# Patient Record
Sex: FEMALE | Race: WHITE | NOT HISPANIC OR LATINO | URBAN - METROPOLITAN AREA
[De-identification: names, ages, dates, MRNs, and addresses within clinical notes are randomized per-mention and may not be internally consistent; named-entity substitution may affect disease eponyms.]

---

## 2021-07-01 ENCOUNTER — EMERGENCY (EMERGENCY)
Facility: HOSPITAL | Age: 46
LOS: 0 days | Discharge: HOME | End: 2021-07-01
Attending: EMERGENCY MEDICINE
Payer: COMMERCIAL

## 2021-07-01 VITALS
OXYGEN SATURATION: 96 % | SYSTOLIC BLOOD PRESSURE: 160 MMHG | TEMPERATURE: 98 F | RESPIRATION RATE: 18 BRPM | HEART RATE: 111 BPM | DIASTOLIC BLOOD PRESSURE: 108 MMHG

## 2021-07-01 VITALS
SYSTOLIC BLOOD PRESSURE: 186 MMHG | HEIGHT: 65 IN | OXYGEN SATURATION: 96 % | WEIGHT: 210.1 LBS | RESPIRATION RATE: 19 BRPM | TEMPERATURE: 98 F | DIASTOLIC BLOOD PRESSURE: 112 MMHG | HEART RATE: 128 BPM

## 2021-07-01 DIAGNOSIS — J45.909 UNSPECIFIED ASTHMA, UNCOMPLICATED: ICD-10-CM

## 2021-07-01 DIAGNOSIS — G43.909 MIGRAINE, UNSPECIFIED, NOT INTRACTABLE, WITHOUT STATUS MIGRAINOSUS: ICD-10-CM

## 2021-07-01 DIAGNOSIS — I10 ESSENTIAL (PRIMARY) HYPERTENSION: ICD-10-CM

## 2021-07-01 DIAGNOSIS — G51.0 BELL'S PALSY: ICD-10-CM

## 2021-07-01 LAB
ALBUMIN SERPL ELPH-MCNC: 3.5 G/DL — SIGNIFICANT CHANGE UP (ref 3.3–5)
ALP SERPL-CCNC: 81 U/L — SIGNIFICANT CHANGE UP (ref 40–120)
ALT FLD-CCNC: 31 U/L — SIGNIFICANT CHANGE UP (ref 12–78)
ANION GAP SERPL CALC-SCNC: 5 MMOL/L — SIGNIFICANT CHANGE UP (ref 5–17)
APTT BLD: 36 SEC — HIGH (ref 27.5–35.5)
AST SERPL-CCNC: 30 U/L — SIGNIFICANT CHANGE UP (ref 15–37)
BASOPHILS # BLD AUTO: 0.07 K/UL — SIGNIFICANT CHANGE UP (ref 0–0.2)
BASOPHILS NFR BLD AUTO: 0.7 % — SIGNIFICANT CHANGE UP (ref 0–2)
BILIRUB SERPL-MCNC: 0.3 MG/DL — SIGNIFICANT CHANGE UP (ref 0.2–1.2)
BUN SERPL-MCNC: 10 MG/DL — SIGNIFICANT CHANGE UP (ref 7–23)
CALCIUM SERPL-MCNC: 8.5 MG/DL — SIGNIFICANT CHANGE UP (ref 8.5–10.1)
CHLORIDE SERPL-SCNC: 107 MMOL/L — SIGNIFICANT CHANGE UP (ref 96–108)
CO2 SERPL-SCNC: 26 MMOL/L — SIGNIFICANT CHANGE UP (ref 22–31)
CREAT SERPL-MCNC: 1.12 MG/DL — SIGNIFICANT CHANGE UP (ref 0.5–1.3)
D DIMER BLD IA.RAPID-MCNC: <150 NG/ML DDU — SIGNIFICANT CHANGE UP
EOSINOPHIL # BLD AUTO: 0.43 K/UL — SIGNIFICANT CHANGE UP (ref 0–0.5)
EOSINOPHIL NFR BLD AUTO: 4.5 % — SIGNIFICANT CHANGE UP (ref 0–6)
GLUCOSE SERPL-MCNC: 124 MG/DL — HIGH (ref 70–99)
HCT VFR BLD CALC: 36.2 % — SIGNIFICANT CHANGE UP (ref 34.5–45)
HGB BLD-MCNC: 10.7 G/DL — LOW (ref 11.5–15.5)
IMM GRANULOCYTES NFR BLD AUTO: 0.8 % — SIGNIFICANT CHANGE UP (ref 0–1.5)
INR BLD: 1.02 RATIO — SIGNIFICANT CHANGE UP (ref 0.88–1.16)
LYMPHOCYTES # BLD AUTO: 1.66 K/UL — SIGNIFICANT CHANGE UP (ref 1–3.3)
LYMPHOCYTES # BLD AUTO: 17.3 % — SIGNIFICANT CHANGE UP (ref 13–44)
MAGNESIUM SERPL-MCNC: 2.1 MG/DL — SIGNIFICANT CHANGE UP (ref 1.6–2.6)
MCHC RBC-ENTMCNC: 20 PG — LOW (ref 27–34)
MCHC RBC-ENTMCNC: 29.6 GM/DL — LOW (ref 32–36)
MCV RBC AUTO: 67.7 FL — LOW (ref 80–100)
MONOCYTES # BLD AUTO: 0.5 K/UL — SIGNIFICANT CHANGE UP (ref 0–0.9)
MONOCYTES NFR BLD AUTO: 5.2 % — SIGNIFICANT CHANGE UP (ref 2–14)
NEUTROPHILS # BLD AUTO: 6.86 K/UL — SIGNIFICANT CHANGE UP (ref 1.8–7.4)
NEUTROPHILS NFR BLD AUTO: 71.5 % — SIGNIFICANT CHANGE UP (ref 43–77)
NT-PROBNP SERPL-SCNC: 13 PG/ML — SIGNIFICANT CHANGE UP (ref 0–125)
PLATELET # BLD AUTO: 308 K/UL — SIGNIFICANT CHANGE UP (ref 150–400)
POTASSIUM SERPL-MCNC: 4 MMOL/L — SIGNIFICANT CHANGE UP (ref 3.5–5.3)
POTASSIUM SERPL-SCNC: 4 MMOL/L — SIGNIFICANT CHANGE UP (ref 3.5–5.3)
PROT SERPL-MCNC: 7.8 GM/DL — SIGNIFICANT CHANGE UP (ref 6–8.3)
PROTHROM AB SERPL-ACNC: 11.9 SEC — SIGNIFICANT CHANGE UP (ref 10.6–13.6)
RBC # BLD: 5.35 M/UL — HIGH (ref 3.8–5.2)
RBC # FLD: 17.2 % — HIGH (ref 10.3–14.5)
SODIUM SERPL-SCNC: 138 MMOL/L — SIGNIFICANT CHANGE UP (ref 135–145)
TROPONIN I SERPL-MCNC: <0.015 NG/ML — SIGNIFICANT CHANGE UP (ref 0.01–0.04)
TSH SERPL-MCNC: 2.26 UU/ML — SIGNIFICANT CHANGE UP (ref 0.34–4.82)
WBC # BLD: 9.6 K/UL — SIGNIFICANT CHANGE UP (ref 3.8–10.5)
WBC # FLD AUTO: 9.6 K/UL — SIGNIFICANT CHANGE UP (ref 3.8–10.5)

## 2021-07-01 PROCEDURE — 93010 ELECTROCARDIOGRAM REPORT: CPT

## 2021-07-01 PROCEDURE — 96360 HYDRATION IV INFUSION INIT: CPT

## 2021-07-01 PROCEDURE — 71045 X-RAY EXAM CHEST 1 VIEW: CPT | Mod: 26

## 2021-07-01 PROCEDURE — 99285 EMERGENCY DEPT VISIT HI MDM: CPT

## 2021-07-01 PROCEDURE — 85025 COMPLETE CBC W/AUTO DIFF WBC: CPT

## 2021-07-01 PROCEDURE — 85379 FIBRIN DEGRADATION QUANT: CPT

## 2021-07-01 PROCEDURE — 83735 ASSAY OF MAGNESIUM: CPT

## 2021-07-01 PROCEDURE — 85730 THROMBOPLASTIN TIME PARTIAL: CPT

## 2021-07-01 PROCEDURE — 85610 PROTHROMBIN TIME: CPT

## 2021-07-01 PROCEDURE — 83880 ASSAY OF NATRIURETIC PEPTIDE: CPT

## 2021-07-01 PROCEDURE — 71045 X-RAY EXAM CHEST 1 VIEW: CPT

## 2021-07-01 PROCEDURE — 84484 ASSAY OF TROPONIN QUANT: CPT

## 2021-07-01 PROCEDURE — 99284 EMERGENCY DEPT VISIT MOD MDM: CPT | Mod: 25

## 2021-07-01 PROCEDURE — 84443 ASSAY THYROID STIM HORMONE: CPT

## 2021-07-01 PROCEDURE — 36415 COLL VENOUS BLD VENIPUNCTURE: CPT

## 2021-07-01 PROCEDURE — 80053 COMPREHEN METABOLIC PANEL: CPT

## 2021-07-01 PROCEDURE — 93005 ELECTROCARDIOGRAM TRACING: CPT

## 2021-07-01 RX ORDER — SODIUM CHLORIDE 9 MG/ML
1000 INJECTION INTRAMUSCULAR; INTRAVENOUS; SUBCUTANEOUS ONCE
Refills: 0 | Status: COMPLETED | OUTPATIENT
Start: 2021-07-01 | End: 2021-07-01

## 2021-07-01 RX ADMIN — SODIUM CHLORIDE 1000 MILLILITER(S): 9 INJECTION INTRAMUSCULAR; INTRAVENOUS; SUBCUTANEOUS at 18:30

## 2021-07-01 RX ADMIN — SODIUM CHLORIDE 1000 MILLILITER(S): 9 INJECTION INTRAMUSCULAR; INTRAVENOUS; SUBCUTANEOUS at 17:30

## 2021-07-01 NOTE — ED PROVIDER NOTE - NS ED MD DISPO DISCHARGE
Home Quality 138: Melanoma: Coordination Of Care: A treatment plan was communicated to the physicians providing continuing care within one month of diagnosis outlining: diagnosis, tumor thickness and a plan for surgery or alternate care. Detail Level: Detailed Quality 224: Stage 0-Iic Melanoma: Overutilization Of Imaging Studies For Only Stage 0-Iic Melanoma: None of the following diagnostic imaging studies ordered: chest X-ray, CT, Ultrasound, MRI, PET, or nuclear medicine scans (ML) Quality 137: Melanoma: Continuity Of Care - Recall System: Documentation of system reason(s) for not entering patient's information into a recall system (eg, melanoma being monitored by another physician provider)

## 2021-07-01 NOTE — ED PROVIDER NOTE - CPE EDP GASTRO NORM
Value Date    BUN 9 10/01/2017     Lab Results   Component Value Date    CREATININE 0.70 10/01/2017       Additional Lab/Culture results: none        PAST MEDICAL, FAMILY AND SOCIAL HISTORY UPDATE:  Past Medical History:   Diagnosis Date    Abnormal EKG     Arthritis     Asthma     B[facility]     Bladder cancer ([facility]     Bone cancer ([facility]     Brain cancer ([facility]     Breast cancer ([facility]     Chronic kidney disease     stage 1    CKD stage G4/A1, GFR 15-29 and albumin creatinine ratio <30 mg/g (HCC)     COPD (chronic obstructive pulmonary disease) (HCC)     Cyst of left kidney     Depression     DVT (deep venous thrombosis) (HCC)     left leg    Endometrial cancer (HCC)     Fibromyalgia     Headache(784.0)     Hyponatremia     IBS (irritable bowel syndrome)     Kidney disease     Migraine     Neuropathy ([facility])     Pain management     PTSD (post-traumatic stress disorder)     Seizures ([facility])     SIADH (syndrome of inappropriate ADH production) ([facility]     Sleep apnea     CPAP nightly     Past Surgical History:   Procedure Laterality Date    BLADDER SURGERY      BLADDER SURGERY      InterStim placement    BREAST LUMPECTOMY Bilateral     CYSTOSCOPY      OTHER SURGICAL HISTORY  06/21/2017    Stage 1 interstim insertion    OTHER SURGICAL HISTORY  07/05/2017    Stage 2 Sacral Nerve Implant    SACRAL NERVE STIMULATOR LEAD PLACEMENT N/A 6/21/2017    SACRAL NERVE STIMULATOR IMPLANT STAGE 1- OFFICE NOTIFYING REP, C-ARM performed by Von Jackson MD at [address] [place] [address] N/A 7/5/2017    SACRAL NERVE STIMULATOR IMPLANT STAGE 2 performed by Von Jackson MD at [facility]      from thigh to chin    DANILO AND BSO  2012, 2014    TONSILLECTOMY AND ADENOIDECTOMY       Family History   Problem Relation Age of Onset    Breast Cancer Mother     Endometrial Cancer Mother     Ovarian Cancer Mother     Kidney Disease Sister     Cancer Sister    [facility] Ketorolac Tromethamine (TORADOL ORAL PO) Take 50 mg by mouth 4 times daily as needed      Pregabalin (LYRICA PO) Take 225 mg by mouth 2 times daily       hydrOXYzine (VISTARIL) 25 MG capsule Take 50 mg by mouth 3 times daily as needed       chlorproMAZINE (THORAZINE) 25 MG tablet Take by mouth 3 times daily 25 mg morning  50 Mg in afternoon  50 mg nighttime       busPIRone (BUSPAR) 15 MG tablet Take 15 mg by mouth 4 times daily      clonazePAM (KLONOPIN) 0.5 MG tablet Take by mouth 2 times daily Indications: half mg in AM, 1 mg bed time       DULoxetine (CYMBALTA) 30 MG extended release capsule Take 30 mg by mouth nightly      rizatriptan (MAXALT-MLT) 10 MG disintegrating tablet Take 10 mg by mouth once as needed       iloperidone (FANAPT) 6 MG tablet Take 1 tablet by mouth 2 times daily 60 tablet 0    pantoprazole (PROTONIX) 40 MG tablet Take 40 mg by mouth daily      dicyclomine (BENTYL) 10 MG capsule Take 2 capsules by mouth every 6 hours as needed (cramps) 30 capsule 0    tiZANidine (ZANAFLEX) 2 MG capsule Take 2 capsules by mouth 3 times daily as needed for Muscle spasms 15 capsule 0    lidocaine (LMX) 4 % cream Apply topically as needed for Pain Apply topically as needed.       ondansetron (ZOFRAN ODT) 4 MG disintegrating tablet Take 1 tablet by mouth every 8 hours as needed for Nausea 20 tablet 0    topiramate (TOPAMAX) 100 MG tablet Take 1 tablet by mouth 2 times daily 60 tablet 0    Biotin 1 MG CAPS Take 1 mg by mouth daily      rOPINIRole (REQUIP) 0.5 MG tablet Take 1 mg by mouth 2 times daily       nadolol (CORGARD) 40 MG tablet Take 40 mg by mouth nightly       meclizine (ANTIVERT) 25 MG tablet Take 25 mg by mouth 3 times daily as needed      SUMAtriptan (IMITREX) 100 MG tablet Take 100 mg by mouth 2 times daily as needed   1    loratadine (CLARITIN) 10 MG tablet Take 10 mg by mouth daily      cloNIDine (CATAPRES) 0.1 MG tablet Take 1 tablet by mouth nightly 30 tablet 0    normal...

## 2021-07-01 NOTE — ED PROVIDER NOTE - CLINICAL SUMMARY MEDICAL DECISION MAKING FREE TEXT BOX
pt very anxious, likely explanation of her BP and tachycardia, however, will do labs, ddimer, TSH, cardiac monitor, and reevaluate.

## 2021-07-01 NOTE — ED ADULT NURSE NOTE - OBJECTIVE STATEMENT
pt is 46 yo female bibems from her allergist office for HTN, tachycardia, pt denies CP, sob, palpitations, or nvd.  +tachycardia 130s, hypertensive noted on monitor.  +anxiety, tearful, pt states "I don't want to be here,"  last visit to PMD was in 2019.

## 2021-07-01 NOTE — ED ADULT NURSE REASSESSMENT NOTE - NS ED NURSE REASSESS COMMENT FT1
pt +tachy and htn, pt denies any HA, cp, sob, palpitations, dizziness, or nvd, pt states she wants to be dc and follow up with PMD.  md Arauz is aware.

## 2021-07-01 NOTE — ED PROVIDER NOTE - OBJECTIVE STATEMENT
44 y/o female with a PMHx of asthma, eczema, migraines, bell's palsy, presents to the ED c/o HTN. Pt was sent here by her allergy specialist Dr. Viveros when her BP was found to be abnormally high in the office. Pt reports the last few months her asthma has worsened w/ intermittent flaring episodes. Denies CP, calf pain or swelling. Pt typically controls her asthma w/ a rescue inhaler (albuterol), last taken today around 2:30pm today. Pt states her BP is typically high when seeing the doctor 2/2 stress and anxiety about seeing the doctor. Pt reports having chronic stress over the last few years due to divorce and issues at home. Denies HTN medication, states she hasn't followed w/ her PCP since before COVID because she moved and is in the process of looking for a new one. Pt is not COVID immunized. Nonsmoker, nondrinker, no IVDA. PFMHx HTN (father). No recent travel.

## 2021-07-01 NOTE — ED ADULT NURSE NOTE - CHIEF COMPLAINT QUOTE
Pt brought in by ambulance from doctors office for hypertension. Pt with hypertension at MD office (200's/100's). EMS states that when she got in ambulance she was hypotensive. Pt found to be hypertensive and tachycardic at triage. Pt A&Ox4.

## 2021-07-01 NOTE — ED PROVIDER NOTE - PROGRESS NOTE DETAILS
pt refused xanax. remains htn/tachy. however labs wnl. pt states she has always been anemic. pt putin Marcela/s book for follow-up in AM with pmd for repeat BP and follow up. pt agreeable with plan. other labs wnl- ddimer, trop, renal function and tsh included. strict return precautions given. MD KORIN pt remains without complaints. feels well. remains htn/tachy. pt had refused xanax. however labs wnl. pt states she has always been anemic. pt put in Marcela/s book for follow-up in AM with pmd for repeat BP and follow up. pt agreeable with plan. other labs wnl- ddimer, trop, renal function and tsh included. strict return precautions given. MD KORIN

## 2021-07-01 NOTE — ED ADULT NURSE NOTE - CHPI ED NUR SYMPTOMS NEG
no back pain/no chest pain/no chills/no congestion/no diaphoresis/no dizziness/no syncope/no vomiting

## 2021-07-01 NOTE — ED ADULT NURSE NOTE - NSIMPLEMENTINTERV_GEN_ALL_ED
Implemented All Universal Safety Interventions:  [place] to call system. Call bell, personal items and telephone within reach. Instruct patient to call for assistance. Room bathroom lighting operational. Non-slip footwear when patient is off stretcher. Physically safe environment: no spills, clutter or unnecessary equipment. Stretcher in lowest position, wheels locked, appropriate side rails in place.

## 2021-07-01 NOTE — ED PROVIDER NOTE - CONSTITUTIONAL, MLM
Anxious appearing, obese, awake, alert, oriented to person, place, time/situation and in no apparent distress. normal...

## 2021-07-09 PROBLEM — L30.9 DERMATITIS, UNSPECIFIED: Chronic | Status: ACTIVE | Noted: 2021-07-01

## 2021-07-09 PROBLEM — G51.0 BELL'S PALSY: Chronic | Status: ACTIVE | Noted: 2021-07-01

## 2021-07-09 PROBLEM — G43.909 MIGRAINE, UNSPECIFIED, NOT INTRACTABLE, WITHOUT STATUS MIGRAINOSUS: Chronic | Status: ACTIVE | Noted: 2021-07-01

## 2021-07-09 PROBLEM — J45.909 UNSPECIFIED ASTHMA, UNCOMPLICATED: Chronic | Status: ACTIVE | Noted: 2021-07-01

## 2021-07-12 PROBLEM — Z00.00 ENCOUNTER FOR PREVENTIVE HEALTH EXAMINATION: Status: ACTIVE | Noted: 2021-07-12

## 2021-07-15 ENCOUNTER — APPOINTMENT (OUTPATIENT)
Dept: CARDIOLOGY | Facility: CLINIC | Age: 46
End: 2021-07-15

## 2022-04-01 ENCOUNTER — TRANSCRIPTION ENCOUNTER (OUTPATIENT)
Age: 47
End: 2022-04-01

## 2022-04-11 ENCOUNTER — APPOINTMENT (OUTPATIENT)
Dept: CARDIOLOGY | Facility: CLINIC | Age: 47
End: 2022-04-11

## 2023-01-07 ENCOUNTER — NON-APPOINTMENT (OUTPATIENT)
Age: 48
End: 2023-01-07

## 2023-05-02 ENCOUNTER — OFFICE (OUTPATIENT)
Dept: URBAN - METROPOLITAN AREA CLINIC 6 | Facility: CLINIC | Age: 48
Setting detail: OPHTHALMOLOGY
End: 2023-05-02
Payer: COMMERCIAL

## 2023-05-02 DIAGNOSIS — H00.11: ICD-10-CM

## 2023-05-02 PROCEDURE — 99203 OFFICE O/P NEW LOW 30 MIN: CPT | Performed by: OPHTHALMOLOGY

## 2023-05-02 ASSESSMENT — LID EXAM ASSESSMENTS: OD_EDEMA: RUL

## 2023-05-02 ASSESSMENT — AXIALLENGTH_DERIVED
OD_AL: 23.8763
OS_AL: 23.7276

## 2023-05-02 ASSESSMENT — CONFRONTATIONAL VISUAL FIELD TEST (CVF)
OS_FINDINGS: FULL
OD_FINDINGS: FULL

## 2023-05-02 ASSESSMENT — KERATOMETRY
OS_K2POWER_DIOPTERS: 43.00
OS_AXISANGLE_DEGREES: 090
OD_K2POWER_DIOPTERS: 43.75
OD_K1POWER_DIOPTERS: 42.25
OD_AXISANGLE_DEGREES: 119
OS_K1POWER_DIOPTERS: 43.00

## 2023-05-02 ASSESSMENT — REFRACTION_AUTOREFRACTION
OS_SPHERE: +0.25
OS_AXIS: 078
OD_AXIS: 001
OS_CYLINDER: -0.25
OD_CYLINDER: -1.00
OD_SPHERE: +0.25

## 2023-05-02 ASSESSMENT — TONOMETRY
OD_IOP_MMHG: 19
OS_IOP_MMHG: 19

## 2023-05-02 ASSESSMENT — SPHEQUIV_DERIVED
OD_SPHEQUIV: -0.25
OS_SPHEQUIV: 0.125

## 2023-05-02 ASSESSMENT — VISUAL ACUITY
OD_BCVA: 20/20-1
OS_BCVA: 20/20

## 2023-05-07 ENCOUNTER — NON-APPOINTMENT (OUTPATIENT)
Age: 48
End: 2023-05-07